# Patient Record
Sex: FEMALE | URBAN - METROPOLITAN AREA
[De-identification: names, ages, dates, MRNs, and addresses within clinical notes are randomized per-mention and may not be internally consistent; named-entity substitution may affect disease eponyms.]

---

## 2020-06-19 ENCOUNTER — LAB SERVICES (OUTPATIENT)
Dept: LAB | Age: 45
End: 2020-06-19

## 2020-06-19 LAB
SARS-COV-2 IGG SERPL QL IA: NEGATIVE
SARS-COV-2 N IGG SERPL QL IA: 0.01

## 2020-06-19 PROCEDURE — 36415 COLL VENOUS BLD VENIPUNCTURE: CPT | Performed by: FAMILY MEDICINE

## 2020-06-19 PROCEDURE — 86769 SARS-COV-2 COVID-19 ANTIBODY: CPT | Performed by: FAMILY MEDICINE

## 2020-08-07 PROBLEM — Z91.89 AT HIGH RISK FOR BREAST CANCER: Status: ACTIVE | Noted: 2020-08-07

## 2020-08-07 PROBLEM — R92.2 DENSE BREAST TISSUE: Status: ACTIVE | Noted: 2020-08-07

## 2020-08-07 PROBLEM — Z80.3 FAMILY HISTORY OF BREAST CANCER: Status: ACTIVE | Noted: 2020-08-07

## 2020-09-03 ENCOUNTER — APPOINTMENT (OUTPATIENT)
Dept: GENETICS | Facility: HOSPITAL | Age: 45
End: 2020-09-03
Attending: GENETIC COUNSELOR, MS
Payer: COMMERCIAL

## 2020-09-03 DIAGNOSIS — Z80.3 FAMILY HISTORY OF BREAST CANCER: ICD-10-CM

## 2020-09-03 PROCEDURE — 96040 HC GENETIC COUNSELING EA 30 MIN: CPT | Performed by: GENETIC COUNSELOR, MS

## 2020-09-04 NOTE — PROGRESS NOTES
Referring Provider:  JG Mejía    Additional Provider(s):  Bridger Skaggs MD    Reason for Referral:  Tyree Aguirre was referred for genetic counseling because of a family history of breast cancer.   Ms. Graciela Lopez is a 53year-old woman of [place] descent genes, BRCA1 and BRCA2, account for the majority of hereditary breast and ovarian cancer families.   Mutations in genes other than BRCA1/2, many of which now have medical management recommendations (e.g., CANDELARIA, CHEK2, PALB2) are identified in 3-10% of indivi significantly increased risk for various cancers. The magnitude of these risks, and the cancers for which she is at increased risk would depend on the gene involved.  Medical recommendations for individuals with BRCA1/2 pathogenic variants were reviewed as made with a physician. Genetic Information Non-Discrimination Act: The legal protections of the Genetic Information Nondiscrimination Act ([place]QUIL[place]) for health insurance and employment were discussed.   [place] does not provide protection for life insurance,

## 2020-09-24 ENCOUNTER — GENETICS ENCOUNTER (OUTPATIENT)
Dept: HEMATOLOGY/ONCOLOGY | Facility: HOSPITAL | Age: 45
End: 2020-09-24

## 2020-09-24 NOTE — PROGRESS NOTES
Referring Provider:                    JG Pascal     Additional Provider(s):              Mamadou James MD     Reason for Referral:  Genia Isaac had genetic testing performed on 9/3/2020 because of a family history of breast cancer.      Genetic reclassified in the future. Ms. Annalisa Goldman should contact me on an annual basis to see if the VUS has been reclassified and to learn if there have been any updates in genetic testing that would apply to her.     In the meantime, Ms. Annalisa Goldman and her relatives should

## 2020-10-22 ENCOUNTER — TELEPHONE (OUTPATIENT)
Dept: HEMATOLOGY/ONCOLOGY | Facility: HOSPITAL | Age: 45
End: 2020-10-22

## 2020-10-22 NOTE — TELEPHONE ENCOUNTER
Patient requested copy of her genetic test report be e-mailed to [email or web address] so she can send it to her sister in [place]. Patient identity verified. I explained e-mail would be encrypted. Pt thank me.

## 2020-11-23 PROCEDURE — 88305 TISSUE EXAM BY PATHOLOGIST: CPT | Performed by: RADIOLOGY

## 2020-12-20 ENCOUNTER — IMMUNIZATION (OUTPATIENT)
Dept: LAB | Age: 45
End: 2020-12-20

## 2020-12-20 DIAGNOSIS — Z23 NEED FOR VACCINATION: Primary | ICD-10-CM

## 2020-12-20 PROCEDURE — 0001A COVID 19 PFIZER-BIONTECH: CPT

## 2020-12-20 PROCEDURE — 91300 COVID 19 PFIZER-BIONTECH: CPT

## 2021-01-09 ENCOUNTER — IMMUNIZATION (OUTPATIENT)
Dept: LAB | Age: 46
End: 2021-01-09

## 2021-01-09 DIAGNOSIS — Z23 NEED FOR VACCINATION: Primary | ICD-10-CM

## 2021-01-09 PROCEDURE — 0002A COVID 19 PFIZER-BIONTECH: CPT

## 2021-01-09 PROCEDURE — 91300 COVID 19 PFIZER-BIONTECH: CPT

## 2021-10-01 ENCOUNTER — IMMUNIZATION (OUTPATIENT)
Dept: LAB | Age: 46
End: 2021-10-01

## 2021-10-01 DIAGNOSIS — Z23 NEED FOR VACCINATION: Primary | ICD-10-CM

## 2021-10-01 PROCEDURE — 0003A COVID 19 PFIZER-BIONTECH: CPT

## 2021-10-01 PROCEDURE — 91300 COVID 19 PFIZER-BIONTECH: CPT

## 2024-02-20 ENCOUNTER — APPOINTMENT (OUTPATIENT)
Dept: URGENT CARE | Age: 49
End: 2024-02-20

## 2024-02-20 ENCOUNTER — V-VISIT (OUTPATIENT)
Dept: URGENT CARE | Age: 49
End: 2024-02-20

## 2024-02-20 VITALS
HEIGHT: 67 IN | WEIGHT: 165 LBS | DIASTOLIC BLOOD PRESSURE: 74 MMHG | BODY MASS INDEX: 25.9 KG/M2 | RESPIRATION RATE: 16 BRPM | TEMPERATURE: 98.2 F | SYSTOLIC BLOOD PRESSURE: 120 MMHG | OXYGEN SATURATION: 98 % | HEART RATE: 79 BPM

## 2024-02-20 DIAGNOSIS — J01.90 ACUTE BACTERIAL SINUSITIS: Primary | ICD-10-CM

## 2024-02-20 DIAGNOSIS — B96.89 ACUTE BACTERIAL SINUSITIS: Primary | ICD-10-CM

## 2024-02-20 PROCEDURE — 99203 OFFICE O/P NEW LOW 30 MIN: CPT | Performed by: NURSE PRACTITIONER

## 2024-02-20 RX ORDER — DIPHENOXYLATE HYDROCHLORIDE AND ATROPINE SULFATE 2.5; .025 MG/1; MG/1
1 TABLET ORAL DAILY
COMMUNITY

## 2024-02-20 RX ORDER — FLUTICASONE PROPIONATE 50 MCG
2 SPRAY, SUSPENSION (ML) NASAL DAILY
Qty: 16 G | Refills: 1 | Status: SHIPPED | OUTPATIENT
Start: 2024-02-20

## 2024-02-20 RX ORDER — CHLORAL HYDRATE 500 MG
CAPSULE ORAL
COMMUNITY

## 2024-02-20 RX ORDER — CEFDINIR 300 MG/1
300 CAPSULE ORAL 2 TIMES DAILY
Qty: 20 CAPSULE | Refills: 0 | Status: SHIPPED | OUTPATIENT
Start: 2024-02-20 | End: 2024-03-01

## 2024-02-20 ASSESSMENT — ENCOUNTER SYMPTOMS
RHINORRHEA: 1
SINUS PRESSURE: 1
SINUS PAIN: 1
CHILLS: 0
FATIGUE: 1
HEADACHES: 1
COUGH: 1
SORE THROAT: 0
FEVER: 0